# Patient Record
Sex: FEMALE | Race: WHITE | URBAN - METROPOLITAN AREA
[De-identification: names, ages, dates, MRNs, and addresses within clinical notes are randomized per-mention and may not be internally consistent; named-entity substitution may affect disease eponyms.]

---

## 2021-03-30 ENCOUNTER — PREPPED CHART (OUTPATIENT)
Dept: URBAN - METROPOLITAN AREA CLINIC 49 | Facility: CLINIC | Age: 76
End: 2021-03-30

## 2021-04-16 ASSESSMENT — TONOMETRY
OS_IOP_MMHG: 17
OD_IOP_MMHG: 18

## 2021-04-16 ASSESSMENT — VISUAL ACUITY
OD_SC: 20/25-1
OS_SC: 20/25

## 2021-04-17 ENCOUNTER — ON CALL (OUTPATIENT)
Dept: URBAN - METROPOLITAN AREA CLINIC 53 | Facility: CLINIC | Age: 76
End: 2021-04-17

## 2021-04-17 DIAGNOSIS — H20.011: ICD-10-CM

## 2021-04-17 PROBLEM — INACTIVE: Status: RESOLVED | Noted: 2021-04-17 | Resolved: 2021-04-17

## 2021-04-17 PROCEDURE — 92014 COMPRE OPH EXAM EST PT 1/>: CPT

## 2021-04-17 NOTE — PATIENT DISCUSSION
Message   Recorded as Task   Date: 10/31/2018 06:16 PM, Created By: Armond Kc   Task Name: 4. Patient Message   Assigned To: WENDY ZARCO   Regarding Patient: JAVIER CARRASQUILLO, Status: Active   Comment:    Armond Kc - 31 Oct 2018 6:16 PM     Patient Message to Provider  \"REASON FOR CALL: Patient is having colonoscopy procedure done friday 11/2/18 at [facility] and will like for a call back in the morning to over instructions to make sure she is on track. Patient would like for a call specifically in the morning because she is not available. Patient says if call is missed a vm can be left.    CALLER'S RELATIONSHIP TO PATIENT: self  IF OTHER, NAME AND RELATIONSHIP: n/a    BEST NUMBER TO BE CONTACTED: [phone]  ALTERNATIVE PHONE NUMBER: n/a    Turnaround time given to caller:  \"\"THIS MESSAGE WILL BE SENT TO  ,,, (state provider's first and last name) ,,,,, THE CLINICAL TEAM WILL RETURN YOUR CALL AS SOON AS THEY HAVE REVIEWED YOUR MESSAGE\"\"    READ BACK MESSAGE TO PATIENT\"   0939- Called patient to discuss. No answer. Detailed message left regarding prep instructions/directions.      Signatures   Electronically signed by : Delmy Buckley R.N.; Nov 1 2018  9:41AM CST     Recommends patient to Start Prednisolone Acetate OD q 3 hours while awake ( hand written script given to patient) and Patricia 128 OD QID (over the counter) . Recommends patient to follow up with primary eye doctor on Tuesday.

## 2021-04-19 ASSESSMENT — TONOMETRY
OD_IOP_MMHG: 18
OS_IOP_MMHG: 18

## 2021-04-19 ASSESSMENT — VISUAL ACUITY
OS_SC: 20/50-2
OD_SC: 20/400

## 2021-04-22 ENCOUNTER — POST-OP (OUTPATIENT)
Dept: URBAN - METROPOLITAN AREA CLINIC 49 | Facility: CLINIC | Age: 76
End: 2021-04-22

## 2021-04-22 DIAGNOSIS — H20.011: ICD-10-CM

## 2021-04-22 DIAGNOSIS — H26.493: ICD-10-CM

## 2021-04-22 DIAGNOSIS — Z98.4: ICD-10-CM

## 2021-04-22 PROCEDURE — 92134 CPTRZ OPH DX IMG PST SGM RTA: CPT

## 2021-04-22 PROCEDURE — 99024 POSTOP FOLLOW-UP VISIT: CPT

## 2021-04-22 ASSESSMENT — VISUAL ACUITY
OD_SC: 20/40+1
OS_SC: 20/25

## 2021-04-22 ASSESSMENT — TONOMETRY
OD_IOP_MMHG: 18
OS_IOP_MMHG: 18

## 2021-04-22 NOTE — PATIENT DISCUSSION
PCO (NO TX): PCO is not visually significant. Educated patient on signs and symptoms of PCO. Continue to monitor at this time.

## 2021-04-22 NOTE — PATIENT DISCUSSION
S/p Cataract surgery OD. Resolving. Start slow taper of Pred acetate as directed. Advised to RTC sooner if increased pain, redness, or visual changes.

## 2021-06-01 ENCOUNTER — 4 WEEK FOLLOW-UP (OUTPATIENT)
Dept: URBAN - METROPOLITAN AREA CLINIC 49 | Facility: CLINIC | Age: 76
End: 2021-06-01

## 2021-06-01 DIAGNOSIS — H20.011: ICD-10-CM

## 2021-06-01 PROCEDURE — 92014 COMPRE OPH EXAM EST PT 1/>: CPT

## 2021-06-01 ASSESSMENT — VISUAL ACUITY
OD_SC: 20/25
OS_SC: 20/30+2
OU_SC: 20/25

## 2021-06-01 ASSESSMENT — TONOMETRY
OD_IOP_MMHG: 17
OS_IOP_MMHG: 17

## 2021-06-01 NOTE — PATIENT DISCUSSION
S/p Cataract surgery OD. Resolving. Rebound information after Taper of Pred. Patient restarted Pred on Friday per instructions of on call doctor. Symptoms have improved. Mild cell noted in A/C today. Continue slow taper of Pred acetate as directed. Advised to RTC sooner if increased pain, redness, or visual changes.

## 2021-07-02 ENCOUNTER — 1 MONTH FOLLOW-UP (OUTPATIENT)
Dept: URBAN - METROPOLITAN AREA CLINIC 49 | Facility: CLINIC | Age: 76
End: 2021-07-02

## 2021-07-02 DIAGNOSIS — Z96.1: ICD-10-CM

## 2021-07-02 DIAGNOSIS — H20.011: ICD-10-CM

## 2021-07-02 PROCEDURE — 92012 INTRM OPH EXAM EST PATIENT: CPT

## 2021-07-02 ASSESSMENT — VISUAL ACUITY
OD_SC: 20/25
OS_SC: 20/20-2

## 2021-07-02 ASSESSMENT — TONOMETRY
OD_IOP_MMHG: 17
OS_IOP_MMHG: 16

## 2021-07-02 NOTE — PATIENT DISCUSSION
S/p Cataract surgery OD. Resolved. Symptoms have improved. Continue slow taper of Pred acetate as directed. Advised to RTC sooner if increased pain, redness, or visual changes.

## 2021-11-05 ENCOUNTER — ANNUAL COMPREHENSIVE EXAM (OUTPATIENT)
Dept: URBAN - METROPOLITAN AREA CLINIC 49 | Facility: CLINIC | Age: 76
End: 2021-11-05

## 2021-11-05 DIAGNOSIS — H16.223: ICD-10-CM

## 2021-11-05 DIAGNOSIS — H26.493: ICD-10-CM

## 2021-11-05 DIAGNOSIS — H43.812: ICD-10-CM

## 2021-11-05 PROCEDURE — 92015 DETERMINE REFRACTIVE STATE: CPT

## 2021-11-05 PROCEDURE — 92014 COMPRE OPH EXAM EST PT 1/>: CPT

## 2021-11-05 ASSESSMENT — VISUAL ACUITY
OS_GLARE: 20/60
OS_SC: 20/25-2
OS_GLARE: >20/400
OD_GLARE: 20/50
OU_SC: 20/25-1
OU_CC: J1
OD_SC: 20/40-2
OD_GLARE: 20/40

## 2021-11-05 ASSESSMENT — TONOMETRY
OD_IOP_MMHG: 17
OS_IOP_MMHG: 16

## 2022-02-25 ENCOUNTER — ESTABLISHED PATIENT (OUTPATIENT)
Dept: URBAN - METROPOLITAN AREA CLINIC 49 | Facility: CLINIC | Age: 77
End: 2022-02-25

## 2022-02-25 DIAGNOSIS — B02.39: ICD-10-CM

## 2022-02-25 PROCEDURE — 92012 INTRM OPH EXAM EST PATIENT: CPT

## 2022-02-25 RX ORDER — PREDNISOLONE ACETATE 10 MG/ML
1 SUSPENSION/ DROPS OPHTHALMIC
Start: 2022-02-25

## 2022-02-25 RX ORDER — ACYCLOVIR 800 MG/1: 1 TABLET ORAL

## 2022-02-25 RX ORDER — ERYTHROMYCIN 5 MG/G
OINTMENT OPHTHALMIC TWICE A DAY
Start: 2022-02-25

## 2022-02-25 RX ORDER — OFLOXACIN 3 MG/ML
1 SOLUTION OPHTHALMIC
Start: 2022-02-25

## 2022-02-25 ASSESSMENT — VISUAL ACUITY
OS_SC: 20/30-1
OD_SC: 20/30-2

## 2022-02-25 ASSESSMENT — TONOMETRY
OS_IOP_MMHG: 16
OD_IOP_MMHG: 16

## 2022-02-25 NOTE — PATIENT DISCUSSION
Patient diagnosed with Shingles on Sunday and started on Acyclovir 800mg BID. Mild inflammation and irregular corneal staining evident today. (-)Aldo's sign. Patient had tele visit with PCP yesterday.  Patient to continue Gabapentin as prescribed by urgent care. Patient currently taking Acyclovir BID PO, spoke with NP for patient to start 5x/day. Start Pred Acetate OD TID. Start Ofloxacin OS TID. Start Erythromycin ointment BID  on eyelid lesions. RTC in 1 week for f/u appointment with Dr. Annika Chamorro. Advised to RTC sooner if increased pain, redness, or visual changes.

## 2022-03-04 ENCOUNTER — FOLLOW UP (OUTPATIENT)
Dept: URBAN - METROPOLITAN AREA CLINIC 49 | Facility: CLINIC | Age: 77
End: 2022-03-04

## 2022-03-04 DIAGNOSIS — B02.39: ICD-10-CM

## 2022-03-04 PROCEDURE — 92014 COMPRE OPH EXAM EST PT 1/>: CPT

## 2022-03-04 ASSESSMENT — VISUAL ACUITY
OD_SC: 20/25
OS_SC: 20/20

## 2022-03-04 ASSESSMENT — TONOMETRY
OD_IOP_MMHG: 16
OS_IOP_MMHG: 16

## 2022-03-04 NOTE — PATIENT DISCUSSION
Improvement. Patient diagnosed with Shingles 1 week ago. Patient dicontinued Acyclovir 800mg BID.  (-)Aldo's sign from previous visit. No inflammation or irregular corneal staining at todays visit (3/4/22).  Patient to continue Gabapentin as prescribed by PCP. Advised patient to continue Pred Acetate OD BID x 4 days, Qday x 3 days, then discontinue. D/C Ofloxacin OS TID. D/C Erythromycin ointment BID  on eyelid lesions. RTC in 1-2 week for f/u appointment with Dr. Maria D Navas. Advised to RTC sooner if increased pain, redness, or visual changes.

## 2022-03-18 ENCOUNTER — FOLLOW UP (OUTPATIENT)
Dept: URBAN - METROPOLITAN AREA CLINIC 49 | Facility: CLINIC | Age: 77
End: 2022-03-18

## 2022-03-18 DIAGNOSIS — B02.39: ICD-10-CM

## 2022-03-18 PROCEDURE — 92014 COMPRE OPH EXAM EST PT 1/>: CPT

## 2022-03-18 ASSESSMENT — VISUAL ACUITY
OD_SC: 20/20-1
OS_PH: 20/30-1
OS_SC: 20/25-2

## 2022-03-18 ASSESSMENT — TONOMETRY
OS_IOP_MMHG: 15
OD_IOP_MMHG: 15

## 2022-03-18 NOTE — PATIENT DISCUSSION
Very mild inflammation noted after taper of Pred. Patient diagnosed with Shingles several week ago. Patient discontinued Acyclovir 800mg BID.  (-)Aldo's sign from previous visits. No irregular corneal staining at todays visit (3/18/22).  Patient is taking Gabapentin as prescribed by PCP. Advised patient to restart Pred Acetate OD TID x 1 week, BID x 1 week, Qday x 1 week, then discontinue. RTC in 1 week for f/u appointment with Dr. Bethany Potter. Advised to RTC sooner if increased pain, redness, or visual changes.

## 2023-08-01 ENCOUNTER — COMPREHENSIVE EXAM (OUTPATIENT)
Dept: URBAN - METROPOLITAN AREA CLINIC 49 | Facility: LOCATION | Age: 78
End: 2023-08-01

## 2023-08-01 DIAGNOSIS — H26.493: ICD-10-CM

## 2023-08-01 DIAGNOSIS — H43.812: ICD-10-CM

## 2023-08-01 DIAGNOSIS — H35.373: ICD-10-CM

## 2023-08-01 DIAGNOSIS — H04.123: ICD-10-CM

## 2023-08-01 PROCEDURE — 92014 COMPRE OPH EXAM EST PT 1/>: CPT

## 2023-08-01 ASSESSMENT — TONOMETRY
OD_IOP_MMHG: 14
OS_IOP_MMHG: 14

## 2023-08-01 ASSESSMENT — VISUAL ACUITY
OD_SC: 20/30
OS_GLARE: 20/40
OD_GLARE: 20/50
OU_SC: 20/30+3
OU_SC: J5@16"
OS_GLARE: 20/60
OU_CC: J1@16"
OD_GLARE: 20/70
OS_SC: 20/40